# Patient Record
(demographics unavailable — no encounter records)

---

## 2025-06-10 NOTE — IMAGING
[de-identified] : Spine: Inspection/Palpation: No tenderness to palpation throughout Cervical/thoracic/lumbar spine. No bony stepoffs, No lesions.  Gait: antalgic, able to perform bilateral toe and heel rise.   Neurologic:  Bilateral Lower Extremities 5/5 Iliopsoas/Quadriceps/Hamstrings/ Tibialis Anterior/ Gastrocnemius. Extensor Hallucis Longus/ Flexor Hallucis Longus except    Sensation intact to light touch L2-S1   No pain with IR/ER/Flexion of HIps bilaterally   X-ray Ap/Lateral of lumbar spine were viewed and interpreted.  Normal alignment is maintained without any spondylolisthesis.  L5-s1 sever disc height loss. degen changes with anterior osteophytes. No fractures.

## 2025-06-10 NOTE — ASSESSMENT
[FreeTextEntry1] : 58M with low back pain and spasm  PT NO NSAIDS due to a/c MDP   We will also prescribe Muscle Relaxants as needed.  Discussed side effects including but not limited to drowsiness and dizziness.  Discussed patient should not drive after taking the medicine.  FU 6 weeks

## 2025-06-10 NOTE — HISTORY OF PRESENT ILLNESS
[de-identified] :  6/10/25 The patient is a 58 year old male who presents today complaining of lower back pain Left side back pain over last few weeks then 2 days ago bent forward and felt sharp pain going across back.  No numbness, weakness, or tingling.  No  bowel or bladder symptoms, HJad previous issues iwth back in past but no real treatment    PMH: Hypothyroid, DM (6.6), high cholesterol , CAD s/p 3 stents on plavix Works for [employer].  (typically [employer] work)   Date of Injury/Onset: 3-4 days Pain:    At Rest: 4/10 With Activity:  9/10 Mechanism of injury: insidious Quality of symptoms: sharp Improves with: heat, tens unit Worse with: bending Prior treatment: no Prior Imaging: no Additional Information: None